# Patient Record
Sex: MALE | Race: BLACK OR AFRICAN AMERICAN | NOT HISPANIC OR LATINO | URBAN - METROPOLITAN AREA
[De-identification: names, ages, dates, MRNs, and addresses within clinical notes are randomized per-mention and may not be internally consistent; named-entity substitution may affect disease eponyms.]

---

## 2017-09-26 ENCOUNTER — EMERGENCY (EMERGENCY)
Facility: HOSPITAL | Age: 37
LOS: 1 days | Discharge: HOME | End: 2017-09-26
Attending: EMERGENCY MEDICINE | Admitting: EMERGENCY MEDICINE
Payer: COMMERCIAL

## 2017-09-26 VITALS
HEART RATE: 94 BPM | TEMPERATURE: 99 F | DIASTOLIC BLOOD PRESSURE: 80 MMHG | RESPIRATION RATE: 16 BRPM | OXYGEN SATURATION: 97 % | SYSTOLIC BLOOD PRESSURE: 126 MMHG

## 2017-09-26 VITALS
RESPIRATION RATE: 16 BRPM | SYSTOLIC BLOOD PRESSURE: 116 MMHG | DIASTOLIC BLOOD PRESSURE: 68 MMHG | HEART RATE: 114 BPM | WEIGHT: 199.96 LBS | OXYGEN SATURATION: 98 % | TEMPERATURE: 98 F

## 2017-09-26 DIAGNOSIS — M54.5 LOW BACK PAIN: ICD-10-CM
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
## 2017-09-26 PROCEDURE — 93005 ELECTROCARDIOGRAM TRACING: CPT

## 2017-09-26 PROCEDURE — 99283 EMERGENCY DEPT VISIT LOW MDM: CPT | Mod: 25

## 2017-09-26 PROCEDURE — 72100 X-RAY EXAM L-S SPINE 2/3 VWS: CPT

## 2017-09-26 PROCEDURE — 72100 X-RAY EXAM L-S SPINE 2/3 VWS: CPT | Mod: 26

## 2017-09-26 PROCEDURE — 99283 EMERGENCY DEPT VISIT LOW MDM: CPT

## 2017-09-26 RX ORDER — LIDOCAINE 4 G/100G
1 CREAM TOPICAL ONCE
Qty: 0 | Refills: 0 | Status: COMPLETED | OUTPATIENT
Start: 2017-09-26 | End: 2017-09-26

## 2017-09-26 RX ADMIN — LIDOCAINE 1 PATCH: 4 CREAM TOPICAL at 22:26

## 2017-09-26 NOTE — ED ADULT NURSE NOTE - OBJECTIVE STATEMENT
Patient BIB EMS c/o lower back pain after having "people fall on me" during a drill @ the [employer]. Patient denies LOC, head trauma, and denies loss of bowel/bladder control. Patient moving all extremities w/o difficulty.

## 2017-09-26 NOTE — ED PROVIDER NOTE - OBJECTIVE STATEMENT
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  has lower back pain , ambulated into ed.  No PMH, PMD Dr Montoya  refused pain medication

## 2017-09-26 NOTE — ED PROVIDER NOTE - ATTENDING CONTRIBUTION TO CARE
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 Pt co low back pain. No direct impact to back. No numb/ting/focal weak. No rad of pain. No fever/chills/recent illness. No cp./sob/palp. no head inj. No other inj or co.  Pt with no history of metastatic disease, unexplained weight loss, fever or night sweats. Pt with no hx immunocompromise disease, HIV, prolonged steroid use, IVDA.  No history of recent infections. No history of aortic disease / aneurysms. No history of urinary retention, bowel incontinence or saddle anesthesia. No apparent history of "red flags" to account for patients low back pain.  Exam with no ext signs of trauma. Lungs CTA bl, no w/r/r. NO other signs of inj.  ·  NEUROLOGICAL: ·  Level of Consciousness: alert,  Cranial Nerve and Pupillary Exam: cranial nerves 2-12 intact, central and peripheral vision intact, extra-ocular movements intact.  Neck: normal. No signs of tenderness or edema. Speech: clear, Gait and Weight Bearing: normal, Deep Tendon Reflexes: normal, 2+ reflexes (PT, Knee), Sensation: present and normal in 4 extremities, Coordination: normal, Pronator Drift: none, Straight Leg Raise: normal bilaterally with equal strength bl, Motor: normal. Nl strength (5/5) equal bl x 4, Posturing: normal, Normal saddle sensation.    Patient doing well, no acute complaints. Discussed with patient about the nature of the back pain and the importance of outpatient follow-up for continued workup, evaluation and definitive diagnosis.  Discussed back pain and neurologic precautions including numbness, tingling, weakness, fever, and changes in bowel/bladder.  An opportunity to ask questions was given . Understanding of all instructions and precautions was verbalized and the patient will follow-up as outpatient as soon as possible. Patient also understands the possibility of needing additional imaging, ie MRI as outpatient. Patient will return with any changes, concerns, or any worsening / persistent symptoms.

## 2017-09-26 NOTE — ED ADULT TRIAGE NOTE - CHIEF COMPLAINT QUOTE
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 No LOC or head trauma amblatory on arrival.

## 2021-10-21 NOTE — ED ADULT NURSE NOTE - DISCHARGE DATE/TIME
The patient has been re-examined and I agree with the above assessment or I updated with my findings.
26-Sep-2017 22:44

## 2022-01-02 NOTE — ED ADULT NURSE NOTE - CHIEF COMPLAINT QUOTE
62 yo man with a history of VAD placement history of COVID infection in April 2020, history of a prolonged hospitalization in 2021 with recurrent sepsis, pneumonia, intubated/trach x2 cycles , chronic gall bladder disease s/p perc dawn, SMA ischemia requiring a stent placement, cachexia who was discharged to rehab but is presented from [facility] on 12/13 with AMS, hypotensive, tachycardic found to be COVID (+). Pt lethargic and mumbling words required ICU admission and pressor support now titrated off and downgraded to the floor. Pending dispo to [facility]. 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 No LOC or head trauma amblatory on arrival.

## 2022-07-13 ENCOUNTER — APPOINTMENT (OUTPATIENT)
Dept: ULTRASOUND IMAGING | Facility: CLINIC | Age: 42
End: 2022-07-13

## 2022-07-13 PROCEDURE — 76536 US EXAM OF HEAD AND NECK: CPT

## 2023-08-07 ENCOUNTER — NON-APPOINTMENT (OUTPATIENT)
Age: 43
End: 2023-08-07